# Patient Record
(demographics unavailable — no encounter records)

---

## 2024-12-16 NOTE — REVIEW OF SYSTEMS
[Limb Pain] : limb pain [Lower Ext Edema] : lower extremity edema [Fever] : no fever [Chills] : no chills [Leg Claudication] : no intermittent leg claudication [Joint Swelling] : no joint swelling [Limb Swelling] : limb swelling

## 2024-12-16 NOTE — PHYSICAL EXAM
[Normal Breath Sounds] : Normal breath sounds [2+] : left 2+ [Ankle Swelling (On Exam)] : present [Ankle Swelling On The Right] : mild [Varicose Veins Of Lower Extremities] : bilaterally [Ankle Swelling Bilaterally] : severe [Alert] : alert [Oriented to Person] : oriented to person [Oriented to Place] : oriented to place [Oriented to Time] : oriented to time [JVD] : no jugular venous distention  [] : not present [de-identified] : Awake and alert [de-identified] : mild edema, large varicose veins bilaterally >3mm,  cord to the inner thigh and medial calf left leg consistent with SVT [de-identified] :  No gross motor or sensory deficits. [de-identified] : Appropriate

## 2024-12-16 NOTE — ADDENDUM
[FreeTextEntry1] :  Documented by Marielle Dockery acting as a scribe for RENÉ PALACIOS on 12/16/2024.

## 2024-12-16 NOTE — HISTORY OF PRESENT ILLNESS
[FreeTextEntry1] : 65 yo male referred by [facility] for SVT of the left leg. The patient reports a long history of varicose veins. He is s/p an RFA of the right GSV in 2016. He s/p a vein stripping of LLE many years ago. He reports that he developed pain in the left leg and was seen in the ED at [facility]. He was diagnosed with an SVT. He reports that the pain has improved.  He was instructed to wear  compression stockings which he has been wearing intermittently and applying  warm compresses to the area.  He reports a family history of varicose veins.  He presents to discuss additional treatment options.  
no

## 2024-12-16 NOTE — ASSESSMENT
[FreeTextEntry1] : 64  year old male with long standing symptomatic varicose veins. He is s/p bilateral lower extremity procedures. He recently developed a left leg SVT.  He has large varicose veins bilaterally. I recommended that he undergo a venous duplex to assess his anatomy.  He was instructed to wear compression stockings on a daily basis. He will follow up when the results of the venous duplex are available. Further treatment will depend upon the results of the duplex.   He was instructed to apply warm compresses to the area and to take Motrin for the SVT.  I recommended to continue to wear compression stockings.

## 2024-12-16 NOTE — END OF VISIT
[FreeTextEntry3] :  All medical record entries made by the breibe were at PHILIP dumas KENNETH, direction and personally dictated by me on 12/16/2024. I have reviewed the chart and agree that the record accurately reflects my personal performance of the history, physical exam, assessment, and plan. I have also personally directed, reviewed, and agreed with the chart.

## 2025-01-06 NOTE — HISTORY OF PRESENT ILLNESS
[FreeTextEntry1] : 65 yo male referred by [facility] for SVT of the left leg. The patient reports a long history of varicose veins. He is s/p an RFA of the right GSV in 2016. He s/p a vein stripping of LLE many years ago. He reports that he developed pain in the left leg and was seen in the ED at [facility]. He was diagnosed with an SVT. He reports that the pain has improved.  He was instructed to wear  compression stockings which he has been wearing intermittently and applying  warm compresses to the area.  He reports a family history of varicose veins.  He presents to discuss additional treatment options.   [de-identified] : 65 yo male with an SVT of the left leg and a history of bilateral venous procedures returns in follow up. He denies left lower extremity pain. He reports that the varicose veins remain hard but are not tender. He underwent a lower extremity venous duplex and is here to discuss the results and additional treatment options.

## 2025-01-06 NOTE — ASSESSMENT
[FreeTextEntry1] : 64  year old male with long standing symptomatic varicose veins. He is s/p bilateral lower extremity procedures. He recently developed a left leg SVT.  He has large varicose veins bilaterally. He underwent a lower extremity venous duplex which demonstrated no DVT and confirmed the presence of the SVT. He has SVI of the SSV bl.  I recommended that he undergo a stab phlebectomy on the left and a Varithena ablation of the SSV on the right. The risks and benefits including DVT, bruising and recurrent varicose veins were discussed with the patient. All of his questions were answered and he  agrees to proceed.    I recommended to continue to wear compression stockings.

## 2025-01-06 NOTE — DATA REVIEWED
[FreeTextEntry1] : Venous Duplex - No DVT bl, left lower extremity SVT, SVI bilateral SSV and GSV remnants.

## 2025-01-06 NOTE — REVIEW OF SYSTEMS
[Fever] : no fever [Chills] : no chills [Leg Claudication] : no intermittent leg claudication [Lower Ext Edema] : lower extremity edema [Joint Swelling] : no joint swelling [Limb Pain] : no limb pain [Limb Swelling] : limb swelling

## 2025-01-06 NOTE — PHYSICAL EXAM
[JVD] : no jugular venous distention  [Normal Breath Sounds] : Normal breath sounds [Ankle Swelling (On Exam)] : present [Ankle Swelling On The Right] : mild [Varicose Veins Of Lower Extremities] : bilaterally [Ankle Swelling Bilaterally] : severe [] : not present [Alert] : alert [Oriented to Person] : oriented to person [Oriented to Place] : oriented to place [Oriented to Time] : oriented to time [de-identified] : Awake and alert [de-identified] : mild edema, large varicose veins bilaterally >3mm,  cord to the inner thigh and medial calf left leg consistent with SVT [de-identified] :  No gross motor or sensory deficits. [de-identified] : Appropriate

## 2025-03-10 NOTE — PHYSICAL EXAM
[Normal Breath Sounds] : Normal breath sounds [Ankle Swelling (On Exam)] : present [Ankle Swelling On The Right] : mild [Varicose Veins Of Lower Extremities] : present [Varicose Veins Of The Right Leg] : of the right leg [Ankle Swelling Bilaterally] : severe [Alert] : alert [Oriented to Person] : oriented to person [Oriented to Place] : oriented to place [Oriented to Time] : oriented to time [JVD] : no jugular venous distention  [] : not present [de-identified] : Awake and alert [de-identified] : mild edema, left leg incisions healing appropriately -sutures removed, ecchymosis of the thigh [de-identified] :  No gross motor or sensory deficits. [de-identified] : Appropriate

## 2025-03-10 NOTE — REVIEW OF SYSTEMS
[As noted in HPI] : as noted in HPI [Lower Ext Edema] : lower extremity edema [Limb Pain] : limb pain [Fever] : no fever [Chills] : no chills

## 2025-03-10 NOTE — ADDENDUM
[FreeTextEntry1] :  All medical record entries made by the scribe were at my, RENÉ PALACIOS, direction and personally dictated by me on 3/10/2025. I have reviewed the chart and agree that the record accurately reflects my personal performance of the history, physical exam, assessment, and plan. I have also personally directed, reviewed, and agreed with the chart.   Documented by Marielle Dockery acting as a scribe for RENÉ PALACIOS on 3/10/2025.

## 2025-03-10 NOTE — DISCUSSION/SUMMARY
[FreeTextEntry1] : 64 year old male  s/p a left lower extremity stab phlebectomy. The patient is doing well post-procedure and his incisions are healing appropriately. The patient's sutures were removed and Steri-Strips were applied. I assured him that the ecchymosis will slowly resolve. He was placed in an ace wrap and instructed to wear a compression stocking for the next 10 days during the day.  Patient has venous insufficiency and varicose veins of the right. He is scheduled for an ablation of the right GSV. Patient is aware of the risks and benefits of the procedure. He agrees to proceed.

## 2025-03-10 NOTE — REASON FOR VISIT
[de-identified] : Left lower extremity stab phlebectomy  [de-identified] : 2/27/25 [de-identified] : 1 [de-identified] : 64 year old male with venous insufficiency and symptomatic varicose veins of the lower extremity. He is  s/p left lower extremity stab phlebectomy a week ago. The patient is doing very well post-procedure. He has been compliant with compression therapy post procedure. He presents for a post procedure check.

## 2025-04-16 NOTE — PROCEDURE
[FreeTextEntry1] : Varithena ablation right SSV [FreeTextEntry2] : Symptomatic Varicose veins with Venous insufficiency [FreeTextEntry3] : Patient was seen in the examination room and consent was obtained.  Patient was then taken to the procedure room where a timeout was called to verify the patient's identity and laterality procedure.  The patient's right small saphenous vein  was then mapped.  Patient's leg was then prepped and draped in standard surgical fashion.  Patient was given injection of 1% plain lidocaine in the distal calf.  A micropuncture needle was used to access the small saphenous vein.  A micropuncture wire was then inserted through the needle.  The needle was then removed.  Micropuncture dilator sheath was then inserted over the wire.  The patient's leg was then elevated for 3 minutes.  The dilator and wire were then removed.  10 cc of injectable saline was then inserted through the sheath.  A Varithena ablation was then performed in the standard fashion.  Compression was held on the perforators and the patient was asked to squeeze his calf muscles 20 times.  Three minutes was allowed to pass.  A repeat ultrasound was performed which demonstrated an excellent result.  Sterile dressing was then applied.  The patient tolerated the procedure and was discharged in stable condition.

## 2025-04-16 NOTE — ADDENDUM
[FreeTextEntry1] : He tolerated the procedure. He walked for 5 minutes in the cai. He verbalized understanding of the post procedure instructions.  He will follow up in 1 week for a venous duplex.

## 2025-04-23 NOTE — ASSESSMENT
[FreeTextEntry1] :  63 yo male with symptomatic varicose veins and venous insufficiency. He is s/p Varithena ablation of the right SSV. He is doing well post- procedure. He underwent a lower extremity duplex which demonstrated a Gastrocnemius DVT. The patient is asymptomatic and is very active. I recommended he begin taking a full dose of Aspirin for 2 weeks and to repeat a duplex in 2 weeks to evaluate for propagation.   full dose of aspirin  2 weeks fu  Follow up sooner for a repeat duplex if a there is pain or swelling.

## 2025-04-23 NOTE — PHYSICAL EXAM
[Normal Breath Sounds] : Normal breath sounds [Ankle Swelling Bilaterally] : bilaterally  [Varicose Veins Of Lower Extremities] : bilaterally [Alert] : alert [Oriented to Person] : oriented to person [Oriented to Place] : oriented to place [Oriented to Time] : oriented to time [JVD] : no jugular venous distention  [Ankle Swelling (On Exam)] : not present [] : not present [de-identified] : Awake and alert [de-identified] : No edema varicose veins slowly decompressing bilaterally [de-identified] :  No gross motor or sensory deficits. [de-identified] : Appropriate

## 2025-04-23 NOTE — HISTORY OF PRESENT ILLNESS
[FreeTextEntry1] : 63 yo male referred by [facility] for SVT of the left leg. The patient reports a long history of varicose veins. He is s/p an RFA of the right GSV in 2016. He s/p a vein stripping of LLE many years ago. He reports that he developed pain in the left leg and was seen in the ED at [facility]. He was diagnosed with an SVT. He reports that the pain has improved.  He was instructed to wear  compression stockings which he has been wearing intermittently and applying  warm compresses to the area.  He reports a family history of varicose veins.  He presents to discuss additional treatment options.   [de-identified] : 63 yo male with venous insufficiency and symptomatic varicose veins of the lower extremity and a history of bilateral venous procedures returns in follow up.  He is s/p left lower extremity stab phlebectomy 6 weeks ago. He denies left lower extremity pain. He is now s/p Varithena ablation of the right SSV. The patient is doing well post-procedure and has been compliant with compression therapy. The patient is very active and is walking without difficulty. He denies right lower extremity edema. He underwent a post procedure venous ultrasound and is here to discuss the results.

## 2025-04-23 NOTE — DATA REVIEWED
[FreeTextEntry1] : Venous Duplex - personally reviewed - Gastrocnemius DVT, ablated SSV and varicose veins

## 2025-04-23 NOTE — REVIEW OF SYSTEMS
[Fever] : no fever [Chills] : no chills [Leg Claudication] : no intermittent leg claudication [Lower Ext Edema] : no extremity edema [Joint Swelling] : no joint swelling [Limb Pain] : no limb pain [Limb Swelling] : no limb swelling

## 2025-05-19 NOTE — ASSESSMENT
[FreeTextEntry1] :  63 yo male with bilateral lower extremity varicose veins. He is s/p a stab phlebectomy on the left and a Varithena ablation of the right SSV. He was found to have a gastrocnemius DVT post Varithena ablation.  He underwent a lower extremity venous duplex which demonstrated resolution of the gastrocnemius DVT and no other DVT. The patient was instructed to stop the full dose of aspirin. He has an SVT of  the posterior calf varicose veins which are no longer tender. I assured him that it will slowly improve over time.   He was instructed to follow up if he develops pain, swelling, etc.

## 2025-05-19 NOTE — PHYSICAL EXAM
[Normal Breath Sounds] : Normal breath sounds [Ankle Swelling Bilaterally] : bilaterally  [Varicose Veins Of Lower Extremities] : bilaterally [Alert] : alert [Oriented to Person] : oriented to person [Oriented to Place] : oriented to place [Oriented to Time] : oriented to time [JVD] : no jugular venous distention  [Ankle Swelling (On Exam)] : not present [] : not present [de-identified] : Awake and alert [de-identified] : No edema, SVT resolving BL varicose veins slowly decompressing bilaterally [de-identified] :  No gross motor or sensory deficits. [de-identified] : Appropriate

## 2025-05-19 NOTE — ADDENDUM
[FreeTextEntry1] :  Documented by Marielle Dockery acting as a scribe for RENÉ PALACIOS on 5/19/2025. Statement Selected

## 2025-05-19 NOTE — END OF VISIT
[FreeTextEntry3] :  All medical record entries made by the breibe were at myPHILIP KENNETH, direction and personally dictated by me on 5/19/2025. I have reviewed the chart and agree that the record accurately reflects my personal performance of the history, physical exam, assessment, and plan. I have also personally directed, reviewed, and agreed with the chart.

## 2025-05-19 NOTE — HISTORY OF PRESENT ILLNESS
[FreeTextEntry1] : 63 yo male referred by [facility] for SVT of the left leg. The patient reports a long history of varicose veins. He is s/p an RFA of the right GSV in 2016. He s/p a vein stripping of LLE many years ago. He reports that he developed pain in the left leg and was seen in the ED at [facility]. He was diagnosed with an SVT. He reports that the pain has improved.  He was instructed to wear  compression stockings which he has been wearing intermittently and applying  warm compresses to the area.  He reports a family history of varicose veins.  He presents to discuss additional treatment options.   [de-identified] : 65 yo male with venous insufficiency and symptomatic varicose veins of the lower extremity returns in follow up. The patient is s/ a left lower extremity stab phlebectomy and a Varithena ablation of the right SSV.  He underwent a lower extremity duplex after his Varithena ablation, which  demonstrated a Gastrocnemius DVT.  He was instructed to take Aspirin 325 mg daily. He reports that he has been taking the Aspirin as prescribed. He denies right lower extremity edema. He reports his varicose veins are slowing decompressing. He underwent a repeat venous duplex and is here to discuss the results and additional treatment options.